# Patient Record
Sex: FEMALE | Race: BLACK OR AFRICAN AMERICAN
[De-identification: names, ages, dates, MRNs, and addresses within clinical notes are randomized per-mention and may not be internally consistent; named-entity substitution may affect disease eponyms.]

---

## 2019-07-22 ENCOUNTER — HOSPITAL ENCOUNTER (EMERGENCY)
Dept: HOSPITAL 61 - ER | Age: 24
Discharge: HOME | End: 2019-07-22
Payer: COMMERCIAL

## 2019-07-22 VITALS — HEIGHT: 60 IN | WEIGHT: 130 LBS | BODY MASS INDEX: 25.52 KG/M2

## 2019-07-22 VITALS
DIASTOLIC BLOOD PRESSURE: 84 MMHG | DIASTOLIC BLOOD PRESSURE: 84 MMHG | SYSTOLIC BLOOD PRESSURE: 144 MMHG | SYSTOLIC BLOOD PRESSURE: 144 MMHG
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
## 2019-07-22 DIAGNOSIS — S06.0X0A: Primary | ICD-10-CM
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
## 2019-07-22 DIAGNOSIS — S00.83XA: ICD-10-CM
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
## 2019-07-22 DIAGNOSIS — J45.909: ICD-10-CM

## 2019-07-22 DIAGNOSIS — S29.012A: ICD-10-CM

## 2019-07-22 DIAGNOSIS — Z91.013: ICD-10-CM

## 2019-07-22 DIAGNOSIS — Z91.018: ICD-10-CM

## 2019-07-22 DIAGNOSIS — S39.012A: ICD-10-CM

## 2019-07-22 PROCEDURE — 81025 URINE PREGNANCY TEST: CPT

## 2019-07-22 PROCEDURE — 96372 THER/PROPH/DIAG INJ SC/IM: CPT

## 2019-07-22 PROCEDURE — 99283 EMERGENCY DEPT VISIT LOW MDM: CPT
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
## 2022-03-21 ENCOUNTER — HOSPITAL ENCOUNTER (OUTPATIENT)
Dept: HOSPITAL 61 - RAD | Age: 27
End: 2022-03-21
Attending: FAMILY MEDICINE
Payer: COMMERCIAL

## 2022-03-21 DIAGNOSIS — M54.50: ICD-10-CM

## 2022-03-21 DIAGNOSIS — Z02.71: Primary | ICD-10-CM

## 2022-03-21 PROCEDURE — 72100 X-RAY EXAM L-S SPINE 2/3 VWS: CPT

## 2022-03-21 NOTE — RAD
XR LUMBAR SPINE 2-3V 



DATE:  3/21/2022 12:17 PM



INDICATION:  Reason: LOW BACK PAIN. / Spl. Instructions:  / History:  



COMPARISON:  None.



FINDINGS:  Five non-rib bearing lumbar-type vertebral bodies are present. 



Bones/Alignment: No evidence of acute compression fracture. There is no listhesis.



Joints: There is no disc space loss. 



Miscellaneous: None.



IMPRESSION:  



No osseous abnormalities.



Electronically signed by: Amilcar Powers MD (3/21/2022 12:47 PM) [id number]